# Patient Record
Sex: FEMALE | Race: WHITE | NOT HISPANIC OR LATINO | Employment: FULL TIME | URBAN - NONMETROPOLITAN AREA
[De-identification: names, ages, dates, MRNs, and addresses within clinical notes are randomized per-mention and may not be internally consistent; named-entity substitution may affect disease eponyms.]

---

## 2021-11-02 ENCOUNTER — OFFICE VISIT (OUTPATIENT)
Dept: URGENT CARE | Facility: CLINIC | Age: 31
End: 2021-11-02
Payer: COMMERCIAL

## 2021-11-02 ENCOUNTER — HOSPITAL ENCOUNTER (OUTPATIENT)
Facility: MEDICAL CENTER | Age: 31
End: 2021-11-02
Attending: PHYSICIAN ASSISTANT
Payer: COMMERCIAL

## 2021-11-02 VITALS
HEART RATE: 77 BPM | SYSTOLIC BLOOD PRESSURE: 102 MMHG | WEIGHT: 146 LBS | RESPIRATION RATE: 16 BRPM | DIASTOLIC BLOOD PRESSURE: 58 MMHG | BODY MASS INDEX: 27.56 KG/M2 | OXYGEN SATURATION: 97 % | TEMPERATURE: 99.2 F | HEIGHT: 61 IN

## 2021-11-02 DIAGNOSIS — L05.91 PILONIDAL CYST: ICD-10-CM

## 2021-11-02 PROBLEM — R55 SYNCOPE AND COLLAPSE: Status: ACTIVE | Noted: 2020-10-28

## 2021-11-02 PROBLEM — R23.2 FACIAL FLUSHING: Status: ACTIVE | Noted: 2020-10-28

## 2021-11-02 PROBLEM — R20.2 PARESTHESIA: Status: ACTIVE | Noted: 2020-10-28

## 2021-11-02 PROCEDURE — 87205 SMEAR GRAM STAIN: CPT

## 2021-11-02 PROCEDURE — 10081 I&D PILONIDAL CYST COMP: CPT | Performed by: PHYSICIAN ASSISTANT

## 2021-11-02 PROCEDURE — 87070 CULTURE OTHR SPECIMN AEROBIC: CPT

## 2021-11-02 RX ORDER — AMOXICILLIN AND CLAVULANATE POTASSIUM 500; 125 MG/1; MG/1
1 TABLET, FILM COATED ORAL 3 TIMES DAILY
Qty: 21 TABLET | Refills: 0 | Status: SHIPPED | OUTPATIENT
Start: 2021-11-02 | End: 2021-11-09

## 2021-11-02 ASSESSMENT — ENCOUNTER SYMPTOMS
FOCAL WEAKNESS: 0
SENSORY CHANGE: 0
TINGLING: 0
NAUSEA: 0
FEVER: 0
CHILLS: 0
VOMITING: 0
MYALGIAS: 0

## 2021-11-02 NOTE — PROGRESS NOTES
"Subjective     Joie Henriquez is a 31 y.o. female who presents with Abscess (pilonidal cyst started a week ago, tender and uncomfortable when sitting)            The patient is here with complaints of swelling and pain just above her gluteal cleft. She has history of having a pilonidal cyst and she thinks it has returned. Her last cyst occurred earlier this year. She had it drained and it improved but returned last week. Patient describes pain with sitting. No fever or chills.     No past medical history on file.    No past surgical history on file.    No family history on file.    Allergies   Allergen Reactions   • Bactrim Ds      Hives all over the body   • Silvadene [Silver Sulfadiazine]      Hives        Medications, Allergies, and current problem list reviewed today in Epic      Review of Systems   Constitutional: Negative for chills, fever and malaise/fatigue.   Gastrointestinal: Negative for nausea and vomiting.   Musculoskeletal: Negative for myalgias.   Skin:        Swelling and tenderness over buttock   Neurological: Negative for tingling, sensory change and focal weakness.     All other systems reviewed and are negative.            Objective     /58   Pulse 77   Temp 37.3 °C (99.2 °F)   Resp 16   Ht 1.549 m (5' 1\")   Wt 66.2 kg (146 lb)   SpO2 97%   BMI 27.59 kg/m²      Physical Exam  Constitutional:       General: She is not in acute distress.     Appearance: She is not ill-appearing.   HENT:      Head: Normocephalic and atraumatic.   Eyes:      Conjunctiva/sclera: Conjunctivae normal.   Cardiovascular:      Rate and Rhythm: Normal rate.   Pulmonary:      Effort: Pulmonary effort is normal. No respiratory distress.   Skin:     General: Skin is warm and dry.          Neurological:      General: No focal deficit present.      Mental Status: She is alert and oriented to person, place, and time.   Psychiatric:         Mood and Affect: Mood normal.         Behavior: Behavior normal.         " "Thought Content: Thought content normal.         Judgment: Judgment normal.                 Procedure: Incision and Drainage  -Risks, benefits, and alternatives discussed. Risks including infection, bleeding, nerve damage, and poor cosmetic outcome  -Sterile technique throughout  -Local anesthesia with 2% lidocaine -Incision with #11 blade into fluctuant area with purulent material expressed  -Culture obtained and packaged for lab  -Cavity probed and any loculations bluntly taken down with hemostat  -Irrigated copiously with NS  -Packed with 1/4\" gauze  -Minimal bleeding with good hemostasis achieved  -The patient tolerated the procedure well                    Assessment & Plan        1. Pilonidal cyst    I&D performed. Cavity probed and cyst is very superficial.   - amoxicillin-clavulanate (AUGMENTIN) 500-125 MG Tab; Take 1 Tablet by mouth 3 times a day for 7 days.  Dispense: 21 Tablet; Refill: 0  - Referral to General Surgery     Wound care discussed.  RTC 2 days.   Most likely will NOT need repacking.  Ultimately, patient needs consult with surgeon- referral placed.    Differential diagnoses, Supportive care, and indications for immediate follow-up discussed with patient.   Pathogenesis of diagnosis discussed including typical length and natural progression.   Instructed to return to clinic or nearest emergency department for any change in condition, further concerns, or worsening of symptoms.    The patient demonstrated a good understanding and agreed with the treatment plan.    Faye Vincent P.A.-C.             "

## 2021-11-03 DIAGNOSIS — L05.91 PILONIDAL CYST: ICD-10-CM

## 2021-11-03 LAB
GRAM STN SPEC: NORMAL
SIGNIFICANT IND 70042: NORMAL
SITE SITE: NORMAL
SOURCE SOURCE: NORMAL

## 2021-11-04 ENCOUNTER — OFFICE VISIT (OUTPATIENT)
Dept: URGENT CARE | Facility: CLINIC | Age: 31
End: 2021-11-04
Payer: COMMERCIAL

## 2021-11-04 VITALS
BODY MASS INDEX: 27.79 KG/M2 | OXYGEN SATURATION: 98 % | DIASTOLIC BLOOD PRESSURE: 68 MMHG | WEIGHT: 147.2 LBS | TEMPERATURE: 98.3 F | RESPIRATION RATE: 14 BRPM | SYSTOLIC BLOOD PRESSURE: 108 MMHG | HEIGHT: 61 IN | HEART RATE: 88 BPM

## 2021-11-04 DIAGNOSIS — L05.91 PILONIDAL CYST: ICD-10-CM

## 2021-11-04 PROCEDURE — 99024 POSTOP FOLLOW-UP VISIT: CPT | Performed by: STUDENT IN AN ORGANIZED HEALTH CARE EDUCATION/TRAINING PROGRAM

## 2021-11-04 NOTE — PROGRESS NOTES
"Subjective:   CHIEF COMPLAINT  Chief Complaint   Patient presents with   • Wound Check     pilonidal cyst; packing removal        HPI  Joie Henriquez is a 31 y.o. female who presents for follow-up of a pilonidal cyst.  Patient was seen in urgent care 2 days ago I&D was performed with placement of packing.  Patient is currently on antibiotics.  A follow-up with general surgery is pending.  Patient reports her symptoms have been well controlled and pain seems to be improving.    REVIEW OF SYSTEMS  General: no fever or chills  GI: no nausea or vomiting  See HPI for further details.    PAST MEDICAL HISTORY  Patient Active Problem List    Diagnosis Date Noted   • Syncope and collapse 10/28/2020   • Paresthesia 10/28/2020   • Facial flushing 10/28/2020       SURGICAL HISTORY  patient denies any surgical history    ALLERGIES  Allergies   Allergen Reactions   • Bactrim Ds      Hives all over the body   • Nickel Unspecified   • Silvadene [Silver Sulfadiazine]      Hives        CURRENT MEDICATIONS  Home Medications     Reviewed by Patricio Mack't (Medical Assistant) on 11/04/21 at 1154  Med List Status: <None>   Medication Last Dose Status   amoxicillin-clavulanate (AUGMENTIN) 500-125 MG Tab Taking Active                SOCIAL HISTORY  Social History     Tobacco Use   • Smoking status: Never Smoker   • Smokeless tobacco: Never Used   Substance and Sexual Activity   • Alcohol use: Never   • Drug use: Never   • Sexual activity: Not on file       FAMILY HISTORY  No family history on file.       Objective:   PHYSICAL EXAM  VITAL SIGNS: /68 (BP Location: Right arm, Patient Position: Sitting)   Pulse 88   Temp 36.8 °C (98.3 °F) (Temporal)   Resp 14   Ht 1.549 m (5' 1\")   Wt 66.8 kg (147 lb 3.2 oz)   SpO2 98%   BMI 27.81 kg/m²     Gen: no acute distress, normal voice  Skin: Midline/right gluteal fold with well-healing, pea-sized opening with packing in place.  Minimal to no erythema.  No " induration.  Localized tenderness to palpation.    Head: Atraumatic, normocephalic  Psych: normal affect, normal judgement, alert, awake        Assessment/Plan:     1. Pilonidal cyst     Wound appears to be appropriately healing and responding to antibiotics.  Packing was removed and I was able to manually express mostly bloody discharge.  No additional packing was placed.  Site was then covered with Telfa and Tegaderm.  The patient says she got a call from [facility] while in the waiting room and is going to return their call to schedule follow-up appointment.  In the meantime I instructed her to continue taking antibiotics to completion.  Routine wound care was discussed.  Follow-up as needed.    Differential diagnosis, natural history, supportive care, and indications for immediate follow-up discussed. All questions answered. Patient agrees with the plan of care.    Follow-up as needed if symptoms worsen or fail to improve to PCP, Urgent care or Emergency Room.    Please note that this dictation was created using voice recognition software. I have made a reasonable attempt to correct obvious errors, but I expect that there are errors of grammar and possibly content that I did not discover before finalizing the note.

## 2021-11-05 LAB
BACTERIA WND AEROBE CULT: ABNORMAL
BACTERIA WND AEROBE CULT: ABNORMAL
GRAM STN SPEC: ABNORMAL
SIGNIFICANT IND 70042: ABNORMAL
SITE SITE: ABNORMAL
SOURCE SOURCE: ABNORMAL

## 2022-01-17 ENCOUNTER — HOSPITAL ENCOUNTER (OUTPATIENT)
Dept: RADIOLOGY | Facility: MEDICAL CENTER | Age: 32
End: 2022-01-17
Attending: OBSTETRICS & GYNECOLOGY
Payer: COMMERCIAL

## 2022-01-17 ENCOUNTER — HOSPITAL ENCOUNTER (OUTPATIENT)
Dept: LAB | Facility: MEDICAL CENTER | Age: 32
End: 2022-01-17
Attending: OBSTETRICS & GYNECOLOGY
Payer: COMMERCIAL

## 2022-01-17 DIAGNOSIS — Z31.41 FERTILITY TESTING: ICD-10-CM

## 2022-01-17 LAB — B-HCG SERPL-ACNC: <1 MIU/ML (ref 0–5)

## 2022-01-17 PROCEDURE — 36415 COLL VENOUS BLD VENIPUNCTURE: CPT

## 2022-01-17 PROCEDURE — 84702 CHORIONIC GONADOTROPIN TEST: CPT

## 2022-01-17 PROCEDURE — 58340 CATHETER FOR HYSTEROGRAPHY: CPT

## 2022-01-17 PROCEDURE — 700117 HCHG RX CONTRAST REV CODE 255: Performed by: OBSTETRICS & GYNECOLOGY

## 2022-01-17 RX ADMIN — IOHEXOL 20 ML: 300 INJECTION, SOLUTION INTRAVENOUS at 13:00

## 2024-03-26 ENCOUNTER — OFFICE VISIT (OUTPATIENT)
Dept: URGENT CARE | Facility: CLINIC | Age: 34
End: 2024-03-26
Payer: COMMERCIAL

## 2024-03-26 VITALS
TEMPERATURE: 98.7 F | BODY MASS INDEX: 28.32 KG/M2 | DIASTOLIC BLOOD PRESSURE: 60 MMHG | SYSTOLIC BLOOD PRESSURE: 90 MMHG | HEIGHT: 61 IN | HEART RATE: 115 BPM | RESPIRATION RATE: 16 BRPM | OXYGEN SATURATION: 95 % | WEIGHT: 150 LBS

## 2024-03-26 DIAGNOSIS — L05.91 PILONIDAL CYST: ICD-10-CM

## 2024-03-26 DIAGNOSIS — L72.9 SKIN CYST: ICD-10-CM

## 2024-03-26 PROCEDURE — 3078F DIAST BP <80 MM HG: CPT | Performed by: PHYSICIAN ASSISTANT

## 2024-03-26 PROCEDURE — 3074F SYST BP LT 130 MM HG: CPT | Performed by: PHYSICIAN ASSISTANT

## 2024-03-26 PROCEDURE — 99213 OFFICE O/P EST LOW 20 MIN: CPT | Performed by: PHYSICIAN ASSISTANT

## 2024-03-26 RX ORDER — CEFDINIR 300 MG/1
300 CAPSULE ORAL 2 TIMES DAILY
Qty: 14 CAPSULE | Refills: 0 | Status: SHIPPED | OUTPATIENT
Start: 2024-03-26 | End: 2024-04-02

## 2024-03-26 ASSESSMENT — ENCOUNTER SYMPTOMS
FEVER: 0
ROS SKIN COMMENTS: SKIN CYST
CHILLS: 0

## 2024-03-26 NOTE — PROGRESS NOTES
"Subjective:   Joie Henriquez  is a 33 y.o. female who presents for Cyst (Possible cyst on lower back 5 days)      Cyst  Pertinent negatives include no chills or fever.     Patient presents urgent care with significant other present.  She describes sensation of having pilonidal cyst once again.  She has had this twice in the past.  Most recently was in .  At that time she had an I&D performed and had to return to clinic multiple times for packing change.  She was referred to general surgery and ultimately declined surgical excision.  Over the last 2 to 3 days she has felt redness swelling and pain in the same area at top of  cleft.  She notes some lymph nodes in the area that feels slightly more full but denies fevers chills or any other systemic symptoms.  She denies draining.  Denies history of MRSA.  She states she did have a staph infection at last time of cyst.    Review of Systems   Constitutional:  Negative for chills and fever.   Skin:         Skin cyst       Allergies   Allergen Reactions    Bactrim Ds      Hives all over the body    Nickel Unspecified    Silvadene [Silver Sulfadiazine]      Hives         Objective:   BP 90/60 (BP Location: Left arm, Patient Position: Sitting, BP Cuff Size: Adult)   Pulse (!) 115   Temp 37.1 °C (98.7 °F) (Temporal)   Resp 16   Ht 1.549 m (5' 1\")   Wt 68 kg (150 lb)   SpO2 95%   BMI 28.34 kg/m²     Physical Exam  Vitals and nursing note reviewed.   Constitutional:       General: She is not in acute distress.     Appearance: She is well-developed. She is not diaphoretic.   HENT:      Head: Normocephalic and atraumatic.      Right Ear: External ear normal.      Left Ear: External ear normal.      Nose: Nose normal.   Eyes:      General: Lids are normal. No scleral icterus.        Right eye: No discharge.         Left eye: No discharge.      Conjunctiva/sclera: Conjunctivae normal.   Pulmonary:      Effort: Pulmonary effort is normal. No respiratory " distress.   Musculoskeletal:         General: Normal range of motion.      Cervical back: Neck supple.   Skin:     General: Skin is warm and dry.      Coloration: Skin is not pale.      Findings: No erythema.          Neurological:      Mental Status: She is alert and oriented to person, place, and time. She is not disoriented.   Psychiatric:         Speech: Speech normal.         Behavior: Behavior normal.     Procedure:  -Using Betadine and alcohol area of cyst is prepped  -Areas anesthetized with lidocaine  -Cyst is incised with #11 blade scalpel  -After exploring wound with 11 blade and finding no purulent material I&D procedure was aborted and incision was dressed.  -Patient tolerated well      Assessment/Plan:   1. Pilonidal cyst  - cefdinir (OMNICEF) 300 MG Cap; Take 1 Capsule by mouth 2 times a day for 7 days.  Dispense: 14 Capsule; Refill: 0  - Referral to General Surgery    2. Skin cyst  - CULTURE WOUND W/ GRAM STAIN; Future  Supportive care is reviewed with patient/caregiver - recommend to push PO fluids and electrolytes,  take full course of Rx, take with probiotics, observe for resolution  Return to clinic with lack of resolution or progression of symptoms.  Recommend Epsom salt soaks as able follow-up with general surgeon or return to clinic    I have worn an N95 mask, gloves and eye protection for the entire encounter with this patient.     Differential diagnosis, natural history, supportive care, and indications for immediate follow-up discussed.